# Patient Record
Sex: MALE | Race: WHITE | NOT HISPANIC OR LATINO | Employment: FULL TIME | URBAN - METROPOLITAN AREA
[De-identification: names, ages, dates, MRNs, and addresses within clinical notes are randomized per-mention and may not be internally consistent; named-entity substitution may affect disease eponyms.]

---

## 2019-04-08 ENCOUNTER — OFFICE VISIT (OUTPATIENT)
Dept: URGENT CARE | Facility: CLINIC | Age: 39
End: 2019-04-08
Payer: COMMERCIAL

## 2019-04-08 VITALS
RESPIRATION RATE: 16 BRPM | DIASTOLIC BLOOD PRESSURE: 82 MMHG | SYSTOLIC BLOOD PRESSURE: 124 MMHG | HEIGHT: 74 IN | WEIGHT: 240 LBS | HEART RATE: 100 BPM | OXYGEN SATURATION: 97 % | TEMPERATURE: 98.1 F | BODY MASS INDEX: 30.8 KG/M2

## 2019-04-08 DIAGNOSIS — J01.90 ACUTE BACTERIAL SINUSITIS: ICD-10-CM

## 2019-04-08 DIAGNOSIS — B96.89 ACUTE BACTERIAL SINUSITIS: ICD-10-CM

## 2019-04-08 DIAGNOSIS — J34.89 SINUS PRESSURE: ICD-10-CM

## 2019-04-08 PROCEDURE — 99203 OFFICE O/P NEW LOW 30 MIN: CPT | Performed by: NURSE PRACTITIONER

## 2019-04-08 RX ORDER — AMOXICILLIN AND CLAVULANATE POTASSIUM 875; 125 MG/1; MG/1
1 TABLET, FILM COATED ORAL 2 TIMES DAILY
Qty: 20 TAB | Refills: 0 | Status: SHIPPED | OUTPATIENT
Start: 2019-04-08 | End: 2019-04-18

## 2019-04-09 ASSESSMENT — ENCOUNTER SYMPTOMS
SINUS PRESSURE: 1
HEADACHES: 1
COUGH: 0
SINUS PAIN: 1

## 2019-04-09 NOTE — PROGRESS NOTES
"Subjective:      Jeronimo De La Cruz is a 38 y.o. male who presents with Pressure Behind the Eyes (x2 months on and off, headache, clumpy mucus)            Sinus Problem   This is a new problem. Episode onset: pt reports he has been having sinus pressure, drainage and pain located above his left eye for at least the last 2 months. He states his sinus symptoms seem to come and go, but they are always the same when he does have them. The problem is unchanged. There has been no fever. Associated symptoms include headaches and sinus pressure. Pertinent negatives include no congestion or coughing. (He states he cannot get rid of this headache pain above his left eye) Past treatments include spray decongestants and oral decongestants (neti pot rinses). The treatment provided no relief.       Review of Systems   HENT: Positive for sinus pain and sinus pressure. Negative for congestion.    Respiratory: Negative for cough.    Neurological: Positive for headaches.   All other systems reviewed and are negative.    History reviewed. No pertinent past medical history. History reviewed. No pertinent surgical history.   Social History     Social History   • Marital status:      Spouse name: N/A   • Number of children: N/A   • Years of education: N/A     Occupational History   • Not on file.     Social History Main Topics   • Smoking status: Never Smoker   • Smokeless tobacco: Never Used   • Alcohol use No   • Drug use: No   • Sexual activity: Not on file     Other Topics Concern   • Not on file     Social History Narrative   • No narrative on file          Objective:     /82   Pulse 100   Temp 36.7 °C (98.1 °F)   Resp 16   Ht 1.88 m (6' 2\")   Wt 108.9 kg (240 lb)   SpO2 97%   BMI 30.81 kg/m²      Physical Exam   Constitutional: He is oriented to person, place, and time. Vital signs are normal. He appears well-developed and well-nourished.   HENT:   Head: Normocephalic and atraumatic.   Right Ear: Tympanic membrane " and external ear normal.   Left Ear: Tympanic membrane and external ear normal.   Nose: Left sinus exhibits frontal sinus tenderness.   Eyes: Pupils are equal, round, and reactive to light. EOM are normal.   Neck: Normal range of motion.   Cardiovascular: Normal rate and regular rhythm.    Pulmonary/Chest: Effort normal and breath sounds normal.   Musculoskeletal: Normal range of motion.   Neurological: He is alert and oriented to person, place, and time.   Skin: Skin is warm and dry. Capillary refill takes less than 2 seconds.   Psychiatric: He has a normal mood and affect. His speech is normal and behavior is normal. Thought content normal.   Vitals reviewed.              Assessment/Plan:     1. Sinus pressure    2. Acute bacterial sinusitis  - amoxicillin-clavulanate (AUGMENTIN) 875-125 MG Tab; Take 1 Tab by mouth 2 times a day for 10 days.  Dispense: 20 Tab; Refill: 0    Discussed with patient we can try a course of ABX and monitor for improvement. He states at this point, he agrees with this as nothing else has helped  Tylenol and ibuprofen for pain  flonase twice daily for one week  Continue nasal saline rinses  Follow up with PCP if symptoms do not improve  Supportive care, differential diagnoses, and indications for immediate follow-up discussed with patient.    Pathogenesis of diagnosis discussed including typical length and natural progression.      Instructed to return to  or nearest emergency department if symptoms fail to improve, for any change in condition, further concerns, or new concerning symptoms.  Patient states understanding of the plan of care and discharge instructions.

## 2019-11-23 ENCOUNTER — OFFICE VISIT (OUTPATIENT)
Dept: URGENT CARE | Facility: CLINIC | Age: 39
End: 2019-11-23
Payer: COMMERCIAL

## 2019-11-23 VITALS
SYSTOLIC BLOOD PRESSURE: 110 MMHG | BODY MASS INDEX: 30.6 KG/M2 | RESPIRATION RATE: 16 BRPM | HEART RATE: 83 BPM | WEIGHT: 238.4 LBS | HEIGHT: 74 IN | TEMPERATURE: 97.5 F | DIASTOLIC BLOOD PRESSURE: 82 MMHG | OXYGEN SATURATION: 93 %

## 2019-11-23 DIAGNOSIS — H60.502 ACUTE OTITIS EXTERNA OF LEFT EAR, UNSPECIFIED TYPE: ICD-10-CM

## 2019-11-23 PROCEDURE — 99213 OFFICE O/P EST LOW 20 MIN: CPT | Performed by: FAMILY MEDICINE

## 2019-11-23 RX ORDER — NEOMYCIN SULFATE, POLYMYXIN B SULFATE AND HYDROCORTISONE 10; 3.5; 1 MG/ML; MG/ML; [USP'U]/ML
4 SUSPENSION/ DROPS AURICULAR (OTIC) 3 TIMES DAILY
Qty: 1 BOTTLE | Refills: 0 | Status: SHIPPED | OUTPATIENT
Start: 2019-11-23 | End: 2019-11-30

## 2019-11-23 ASSESSMENT — ENCOUNTER SYMPTOMS
WEIGHT LOSS: 0
VOMITING: 0
NAUSEA: 0
EYE REDNESS: 0
EYE DISCHARGE: 0
MYALGIAS: 0

## 2019-11-23 NOTE — PROGRESS NOTES
"Subjective:      Jeronimo De La Cruz is a 39 y.o. male who presents with Otalgia (left ear, dsicomfort x 2-3 days, also has cough and sore throat x 1 week )            3 days left earache. No drainage. No fever. Initially had mild ST and cough x 1 week. No change in hearing. No trauma/barotrauma. No recent swimming. +PMH otitis media. +PMH DM/unsure of recent control. Moderate pain with direct pressure to ear. No OTC medications.  No other aggravating or alleviating factors.        Review of Systems   Constitutional: Negative for malaise/fatigue and weight loss.   Eyes: Negative for discharge and redness.   Gastrointestinal: Negative for nausea and vomiting.   Musculoskeletal: Negative for joint pain and myalgias.   Skin: Negative for itching and rash.     .  Medications, Allergies, and current problem list reviewed today in Epic       Objective:     /82   Pulse 83   Temp 36.4 °C (97.5 °F) (Temporal)   Resp 16   Ht 1.88 m (6' 2\")   Wt 108.1 kg (238 lb 6.4 oz)   SpO2 93%   BMI 30.61 kg/m²      Physical Exam  Constitutional:       General: He is not in acute distress.     Appearance: He is well-developed.   HENT:      Head: Normocephalic and atraumatic.      Right Ear: Tympanic membrane and ear canal normal.      Left Ear: Tympanic membrane normal.      Ears:      Comments: Left canal red and swollen without pointing abscess. Pain with pinna movement.   Eyes:      Conjunctiva/sclera: Conjunctivae normal.   Cardiovascular:      Rate and Rhythm: Normal rate and regular rhythm.      Heart sounds: Normal heart sounds. No murmur.   Pulmonary:      Effort: Pulmonary effort is normal.      Breath sounds: Normal breath sounds. No wheezing.   Skin:     General: Skin is warm and dry.      Findings: No rash.   Neurological:      Mental Status: He is alert and oriented to person, place, and time.                 Assessment/Plan:     1. Acute otitis externa of left ear, unspecified type  neomycin-polymyxin-HC (PEDIOTIC " HC) [id number] Suspension     Differential diagnosis, natural history, supportive care, and indications for immediate follow-up discussed at length.

## 2020-03-11 ENCOUNTER — OFFICE VISIT (OUTPATIENT)
Dept: URGENT CARE | Facility: PHYSICIAN GROUP | Age: 40
End: 2020-03-11
Payer: COMMERCIAL

## 2020-03-11 VITALS
HEART RATE: 88 BPM | SYSTOLIC BLOOD PRESSURE: 104 MMHG | TEMPERATURE: 98 F | WEIGHT: 237.8 LBS | OXYGEN SATURATION: 97 % | HEIGHT: 74 IN | BODY MASS INDEX: 30.52 KG/M2 | DIASTOLIC BLOOD PRESSURE: 68 MMHG | RESPIRATION RATE: 18 BRPM

## 2020-03-11 DIAGNOSIS — H60.502 ACUTE OTITIS EXTERNA OF LEFT EAR, UNSPECIFIED TYPE: ICD-10-CM

## 2020-03-11 PROCEDURE — 99214 OFFICE O/P EST MOD 30 MIN: CPT | Performed by: PHYSICIAN ASSISTANT

## 2020-03-11 RX ORDER — OFLOXACIN 3 MG/ML
5 SOLUTION AURICULAR (OTIC) DAILY
Qty: 10 ML | Refills: 0 | Status: SHIPPED | OUTPATIENT
Start: 2020-03-11

## 2020-03-11 ASSESSMENT — ENCOUNTER SYMPTOMS
HEADACHES: 0
SORE THROAT: 0
VOMITING: 0
RHINORRHEA: 0

## 2020-03-11 ASSESSMENT — PAIN SCALES - GENERAL: PAINLEVEL: 1=MINIMAL PAIN

## 2020-03-11 NOTE — PROGRESS NOTES
"Subjective:   Jeronimo De La Cruz is a 39 y.o. male who presents for Otalgia ((L) ear, x1 day )        Otalgia    There is pain in both ears. This is a new problem. The current episode started yesterday. The problem occurs constantly. The problem has been gradually worsening. There has been no fever. The pain is mild. Pertinent negatives include no ear discharge, headaches, hearing loss, rhinorrhea, sore throat or vomiting. He has tried nothing for the symptoms. The treatment provided no relief. typically gets outter ear infection once a year.     Review of Systems   HENT: Positive for ear pain. Negative for ear discharge, hearing loss, rhinorrhea and sore throat.    Gastrointestinal: Negative for vomiting.   Neurological: Negative for headaches.       PMH:  has a past medical history of Diabetes ([facility]).  MEDS:   Current Outpatient Medications:   •  ofloxacin otic sol (FLOXIN OTIC) 0.3 % Solution, Place 5 Drops in ear every day., Disp: 10 mL, Rfl: 0  •  ALLOPURINOL PO, Take 800 mg by mouth., Disp: , Rfl:   ALLERGIES: No Known Allergies  SURGHX: History reviewed. No pertinent surgical history.  SOCHX:  reports that he has never smoked. He has never used smokeless tobacco. He reports that he does not drink alcohol or use drugs.  FH: Family history was reviewed, no pertinent findings to report   Objective:   /68 (BP Location: Left arm, Patient Position: Sitting, BP Cuff Size: Adult)   Pulse 88   Temp 36.7 °C (98 °F) (Temporal)   Resp 18   Ht 1.88 m (6' 2\")   Wt 107.9 kg (237 lb 12.8 oz)   SpO2 97%   BMI 30.53 kg/m²   Physical Exam  Vitals signs reviewed.   Constitutional:       General: He is not in acute distress.     Appearance: Normal appearance. He is well-developed. He is not toxic-appearing.   HENT:      Head: Normocephalic and atraumatic.      Right Ear: Tympanic membrane, ear canal and external ear normal.      Left Ear: Tympanic membrane and external ear normal.      Ears:      Comments: Left EAC is " mildly erythematous and edematous with scant white discharge.  Mild pain with manipulation of tragus and auricle.     Nose: Nose normal.   Eyes:      General: Lids are normal.      Conjunctiva/sclera: Conjunctivae normal.   Neck:      Musculoskeletal: Neck supple.   Cardiovascular:      Rate and Rhythm: Normal rate and regular rhythm.      Heart sounds: Normal heart sounds, S1 normal and S2 normal. No murmur. No friction rub. No gallop.    Pulmonary:      Effort: Pulmonary effort is normal. No respiratory distress.      Breath sounds: Normal breath sounds. No decreased breath sounds, wheezing, rhonchi or rales.   Skin:     General: Skin is warm and dry.      Capillary Refill: Capillary refill takes less than 2 seconds.   Neurological:      Mental Status: He is alert and oriented to person, place, and time.      Cranial Nerves: No cranial nerve deficit.      Sensory: No sensory deficit.   Psychiatric:         Speech: Speech normal.         Behavior: Behavior normal.         Thought Content: Thought content normal.         Judgment: Judgment normal.           Assessment/Plan:   1. Acute otitis externa of left ear, unspecified type  - ofloxacin otic sol (FLOXIN OTIC) 0.3 % Solution; Place 5 Drops in ear every day.  Dispense: 10 mL; Refill: 0    Differential diagnosis, natural history, supportive care, and indications for immediate follow-up discussed.

## 2024-01-08 ENCOUNTER — OFFICE VISIT (OUTPATIENT)
Dept: URGENT CARE | Facility: PHYSICIAN GROUP | Age: 44
End: 2024-01-08
Payer: COMMERCIAL

## 2024-01-08 VITALS
WEIGHT: 230 LBS | SYSTOLIC BLOOD PRESSURE: 122 MMHG | DIASTOLIC BLOOD PRESSURE: 76 MMHG | TEMPERATURE: 97.9 F | OXYGEN SATURATION: 97 % | BODY MASS INDEX: 29.52 KG/M2 | HEIGHT: 74 IN | RESPIRATION RATE: 17 BRPM | HEART RATE: 96 BPM

## 2024-01-08 DIAGNOSIS — B96.89 ACUTE BACTERIAL SINUSITIS: ICD-10-CM

## 2024-01-08 DIAGNOSIS — R05.1 ACUTE COUGH: ICD-10-CM

## 2024-01-08 DIAGNOSIS — J01.90 ACUTE BACTERIAL SINUSITIS: ICD-10-CM

## 2024-01-08 PROCEDURE — 3074F SYST BP LT 130 MM HG: CPT | Performed by: REGISTERED NURSE

## 2024-01-08 PROCEDURE — 99203 OFFICE O/P NEW LOW 30 MIN: CPT | Performed by: REGISTERED NURSE

## 2024-01-08 PROCEDURE — 3078F DIAST BP <80 MM HG: CPT | Performed by: REGISTERED NURSE

## 2024-01-08 RX ORDER — AMOXICILLIN AND CLAVULANATE POTASSIUM 875; 125 MG/1; MG/1
1 TABLET, FILM COATED ORAL 2 TIMES DAILY
Qty: 20 TABLET | Refills: 0 | Status: SHIPPED | OUTPATIENT
Start: 2024-01-08

## 2024-01-08 RX ORDER — DEXTROMETHORPHAN HYDROBROMIDE AND PROMETHAZINE HYDROCHLORIDE 15; 6.25 MG/5ML; MG/5ML
5 SYRUP ORAL EVERY 4 HOURS PRN
Qty: 120 ML | Refills: 0 | Status: SHIPPED | OUTPATIENT
Start: 2024-01-08

## 2024-01-08 RX ORDER — BENZONATATE 100 MG/1
100 CAPSULE ORAL 3 TIMES DAILY PRN
Qty: 30 CAPSULE | Refills: 0 | Status: SHIPPED | OUTPATIENT
Start: 2024-01-08 | End: 2024-01-08

## 2024-01-08 ASSESSMENT — ENCOUNTER SYMPTOMS
COUGH: 1
CHILLS: 0
SINUS PAIN: 1
DIZZINESS: 0
HEADACHES: 0
FEVER: 0
SORE THROAT: 1
SHORTNESS OF BREATH: 0

## 2024-01-08 NOTE — PROGRESS NOTES
"Subjective:   Jeronimo De La Cruz is a 43 y.o. male who presents for Cough (Cold symptoms on and off for a few weeks, went away came back worse a couple days ago, mucinex) and Nasal Congestion      HPI  Cold symptoms on and off for a few weeks, then over the past few days had worsening of sinus pressure, congestion, drainage. Its thick with green/yellow drainage. Using OTC cold and sinus medication with partial relief. Hx of ear infections. No hx of pna or hypoxia. Immunizations current.    Review of Systems   Constitutional:  Negative for chills and fever.   HENT:  Positive for congestion, sinus pain and sore throat.    Respiratory:  Positive for cough. Negative for shortness of breath.    Cardiovascular:  Negative for chest pain.   Skin:  Negative for rash.   Neurological:  Negative for dizziness and headaches.     Medications, Allergies, and current problem list reviewed today in Epic.     Objective:     /76   Pulse 96   Temp 36.6 °C (97.9 °F) (Temporal)   Resp 17   Ht 1.88 m (6' 2\")   Wt 104 kg (230 lb)   SpO2 97%     Physical Exam  Vitals and nursing note reviewed.   Constitutional:       General: He is not in acute distress.     Appearance: Normal appearance. He is well-developed. He is not ill-appearing, toxic-appearing or diaphoretic.   HENT:      Head: Normocephalic and atraumatic.      Right Ear: Hearing, tympanic membrane, ear canal and external ear normal.      Left Ear: Hearing, tympanic membrane, ear canal and external ear normal.      Nose: Mucosal edema, congestion and rhinorrhea present. Rhinorrhea is purulent.      Right Turbinates: Swollen.      Left Turbinates: Swollen.      Right Sinus: Maxillary sinus tenderness and frontal sinus tenderness present.      Left Sinus: Maxillary sinus tenderness and frontal sinus tenderness present.      Mouth/Throat:      Mouth: Mucous membranes are moist.      Dentition: Normal dentition. No dental caries.      Pharynx: Posterior oropharyngeal erythema " present. No oropharyngeal exudate.      Comments: Colored PND noted  Eyes:      General: No scleral icterus.        Right eye: No discharge.         Left eye: No discharge.      Conjunctiva/sclera: Conjunctivae normal.   Cardiovascular:      Rate and Rhythm: Normal rate and regular rhythm.      Pulses: Normal pulses.      Heart sounds: Normal heart sounds.   Pulmonary:      Effort: Pulmonary effort is normal. No respiratory distress.      Breath sounds: Normal breath sounds. No stridor. No wheezing, rhonchi or rales.   Musculoskeletal:      Cervical back: Normal range of motion and neck supple.      Right lower leg: No edema.      Left lower leg: No edema.   Lymphadenopathy:      Cervical: Cervical adenopathy present.   Skin:     General: Skin is warm and dry.      Findings: No rash.      Nails: There is no clubbing.   Neurological:      General: No focal deficit present.      Mental Status: He is alert and oriented to person, place, and time. Mental status is at baseline.   Psychiatric:         Mood and Affect: Mood normal.       Assessment/Plan:     Differential diagnosis discussed     1. Acute bacterial sinusitis  amoxicillin-clavulanate (AUGMENTIN) 875-125 MG Tab      2. Acute cough  promethazine-dextromethorphan (PROMETHAZINE-DM) 6.25-15 MG/5ML syrup    DISCONTINUED: benzonatate (TESSALON) 100 MG Cap        -2 weeks of on and off symptoms over the past few days worsening sinus symptoms with worsening pressure congestion and thick drainage  -Vital signs reassuring  -No adventitious lung sounds to indicate bacterial lung infection  -Upper airway exam consistent with bacterial sinusitis, no red flag signs or symptoms  -Augmentin  -Cough syrup to help with rest and cough at night  -Recommend adequate hydration, rest, deep breathing and coughing, ambulation as tolerated, OTC medications.  -Discussed return precautions    Return to clinic or go to ED if symptoms worsen or persist. Indications for ED discussed at  length. Red flag symptoms discussed. All side effects of medication discussed including allergic response, GI upset, tendon injury, rash, sedation etc. Patient and/or guardian voices understanding.     I personally reviewed prior external notes and test results pertinent to today's visit as well as additional imaging and testing completed in clinic today.     Please note that this dictation was created using voice recognition software. I have made every reasonable attempt to correct obvious errors, but I expect that there are errors of grammar and possibly content that I did not discover before finalizing the note.    This note was electronically signed by FADY Pedraza.

## 2024-12-26 ENCOUNTER — TELEPHONE (OUTPATIENT)
Dept: CARDIOLOGY | Facility: MEDICAL CENTER | Age: 44
End: 2024-12-26
Payer: COMMERCIAL

## 2024-12-26 NOTE — TELEPHONE ENCOUNTER
Called patient in regards to records for NP appointment with Dr. RUSSO To review most recent lab results, ekg, any cardiac testing or ,if patient has been treated by a cardiologist. No answer, left voicemail to call back

## 2025-01-13 ENCOUNTER — OFFICE VISIT (OUTPATIENT)
Dept: CARDIOLOGY | Facility: MEDICAL CENTER | Age: 45
End: 2025-01-13
Attending: INTERNAL MEDICINE
Payer: COMMERCIAL

## 2025-01-13 VITALS
WEIGHT: 219 LBS | DIASTOLIC BLOOD PRESSURE: 76 MMHG | SYSTOLIC BLOOD PRESSURE: 104 MMHG | BODY MASS INDEX: 28.11 KG/M2 | RESPIRATION RATE: 16 BRPM | HEIGHT: 74 IN | HEART RATE: 93 BPM | OXYGEN SATURATION: 96 %

## 2025-01-13 DIAGNOSIS — I42.9 CARDIOMYOPATHY, UNSPECIFIED TYPE (HCC): ICD-10-CM

## 2025-01-13 DIAGNOSIS — Z76.89 ENCOUNTER TO ESTABLISH CARE: ICD-10-CM

## 2025-01-13 PROCEDURE — 99204 OFFICE O/P NEW MOD 45 MIN: CPT | Performed by: INTERNAL MEDICINE

## 2025-01-13 PROCEDURE — 99202 OFFICE O/P NEW SF 15 MIN: CPT | Performed by: INTERNAL MEDICINE

## 2025-01-13 PROCEDURE — 93005 ELECTROCARDIOGRAM TRACING: CPT | Mod: TC | Performed by: INTERNAL MEDICINE

## 2025-01-13 RX ORDER — LOSARTAN POTASSIUM 25 MG/1
25 TABLET ORAL DAILY
Qty: 90 TABLET | Refills: 3 | Status: SHIPPED | OUTPATIENT
Start: 2025-01-13

## 2025-01-13 RX ORDER — METOPROLOL SUCCINATE 25 MG/1
25 TABLET, EXTENDED RELEASE ORAL DAILY
Qty: 90 TABLET | Refills: 3 | Status: SHIPPED | OUTPATIENT
Start: 2025-01-13

## 2025-01-13 RX ORDER — LOSARTAN POTASSIUM 25 MG/1
25 TABLET ORAL DAILY
COMMUNITY
Start: 2024-12-07 | End: 2025-01-13 | Stop reason: SDUPTHER

## 2025-01-13 RX ORDER — SPIRONOLACTONE 25 MG/1
25 TABLET ORAL DAILY
COMMUNITY
Start: 2024-12-07 | End: 2025-01-13 | Stop reason: SDUPTHER

## 2025-01-13 RX ORDER — ATORVASTATIN CALCIUM 40 MG/1
40 TABLET, FILM COATED ORAL DAILY
COMMUNITY
Start: 2024-12-07 | End: 2025-01-16 | Stop reason: SDUPTHER

## 2025-01-13 RX ORDER — SPIRONOLACTONE 25 MG/1
25 TABLET ORAL DAILY
Qty: 90 TABLET | Refills: 3 | Status: SHIPPED | OUTPATIENT
Start: 2025-01-13

## 2025-01-13 RX ORDER — METOPROLOL TARTRATE 25 MG/1
25 TABLET, FILM COATED ORAL
COMMUNITY
Start: 2024-12-07 | End: 2025-01-13

## 2025-01-13 ASSESSMENT — ENCOUNTER SYMPTOMS
ORTHOPNEA: 0
DIZZINESS: 0
BACK PAIN: 0
ABDOMINAL PAIN: 0
FEVER: 0
WEIGHT GAIN: 0
ALTERED MENTAL STATUS: 0
PALPITATIONS: 0
BLURRED VISION: 0
NEAR-SYNCOPE: 0
DIARRHEA: 0
DYSPNEA ON EXERTION: 0
FLANK PAIN: 0
PND: 0
WEIGHT LOSS: 0
NAUSEA: 0
HEARTBURN: 0
SYNCOPE: 0
DEPRESSION: 0
CONSTIPATION: 0
CLAUDICATION: 0
IRREGULAR HEARTBEAT: 0
SHORTNESS OF BREATH: 0
DECREASED APPETITE: 0
VOMITING: 0
COUGH: 0

## 2025-01-13 ASSESSMENT — FIBROSIS 4 INDEX: FIB4 SCORE: 0.47

## 2025-01-13 NOTE — PROGRESS NOTES
Cardiology Note    Chief Complaint   Patient presents with    New Patient     NP Dx: Establish Care       History of Present Illness: Jeronimo De La Cruz is a 44 y.o. male PMH DM2 who presents for initial visit.     Admitted [facility] 12/10/24 for acute hypoxic respiratory failure found possibly pneumonia and new HFrEF.     Recalls had shortness of breath and flu like symptoms. No other symptoms. No chest pain/pressure. No orthopnea, pnd. No edema. Additionally in October prior to hospitalization he had a stressful day and developed shortness of breath and chest pain following stay-cation at [facility]. Took aspirin. Improved. Slept through it. Did not seek medical attention.     Review of Systems   Constitutional: Negative for decreased appetite, fever, malaise/fatigue, weight gain and weight loss.   HENT:  Negative for congestion and nosebleeds.    Eyes:  Negative for blurred vision.   Cardiovascular:  Negative for chest pain, claudication, dyspnea on exertion, irregular heartbeat, leg swelling, near-syncope, orthopnea, palpitations, paroxysmal nocturnal dyspnea and syncope.   Respiratory:  Negative for cough and shortness of breath.    Endocrine: Negative for cold intolerance and heat intolerance.   Skin:  Negative for rash.   Musculoskeletal:  Negative for back pain.   Gastrointestinal:  Negative for abdominal pain, constipation, diarrhea, heartburn, melena, nausea and vomiting.   Genitourinary:  Negative for dysuria, flank pain and hematuria.   Neurological:  Negative for dizziness.   Psychiatric/Behavioral:  Negative for altered mental status and depression.          Past Medical History:   Diagnosis Date    Diabetes (HCC)          History reviewed. No pertinent surgical history.      Current Outpatient Medications   Medication Sig Dispense Refill    atorvastatin (LIPITOR) 40 MG Tab Take 40 mg by mouth every day.      metFORMIN (GLUCOPHAGE) 850 MG Tab Take 750 mg by mouth 2 times a day.      metoprolol SR (TOPROL  XL) 25 MG TABLET SR 24 HR Take 1 Tablet by mouth every day. 90 Tablet 3    losartan (COZAAR) 25 MG Tab Take 1 Tablet by mouth every day. 90 Tablet 3    spironolactone (ALDACTONE) 25 MG Tab Take 1 Tablet by mouth every day. 90 Tablet 3    promethazine-dextromethorphan (PROMETHAZINE-DM) 6.25-15 MG/5ML syrup Take 5 mL by mouth every four hours as needed for Cough. 120 mL 0    ofloxacin otic sol (FLOXIN OTIC) 0.3 % Solution Place 5 Drops in ear every day. 10 mL 0    ALLOPURINOL PO Take 800 mg by mouth.      amoxicillin-clavulanate (AUGMENTIN) 875-125 MG Tab Take 1 Tablet by mouth 2 times a day. (Patient not taking: Reported on 1/13/2025) 20 Tablet 0     No current facility-administered medications for this visit.         No Known Allergies      History reviewed. No pertinent family history.      Social History     Socioeconomic History    Marital status:      Spouse name: Not on file    Number of children: Not on file    Years of education: Not on file    Highest education level: Not on file   Occupational History    Not on file   Tobacco Use    Smoking status: Never    Smokeless tobacco: Never   Substance and Sexual Activity    Alcohol use: No    Drug use: No    Sexual activity: Not on file   Other Topics Concern    Not on file   Social History Narrative    Not on file     Social Drivers of Health     Financial Resource Strain: Low Risk  (12/10/2024)    Received from [facility]    Overall Financial Resource Strain (CARDIA)     Difficulty of Paying Living Expenses: Not very hard   Food Insecurity: No Food Insecurity (12/10/2024)    Received from [facility]    Hunger Vital Sign     Worried About Running Out of Food in the Last Year: Never true     Ran Out of Food in the Last Year: Never true   Transportation Needs: No Transportation Needs (12/10/2024)    Received from [facility]    PRAPARE - Transportation     Lack of Transportation (Medical): No     Lack of Transportation (Non-Medical): No  "  Physical Activity: Sufficiently Active (12/10/2024)    Received from [facility]    Exercise Vital Sign     Days of Exercise per Week: 5 days     Minutes of Exercise per Session: 70 min   Stress: No Stress Concern Present (12/10/2024)    Received from [facility]    [employer] - Occupational Stress Questionnaire     Feeling of Stress : Not at all   Social Connections: Moderately Isolated (12/10/2024)    Received from [facility]    Social Connection and Isolation Panel [NHANES]     Frequency of Communication with Friends and Family: More than three times a week     Frequency of Social Gatherings with Friends and Family: More than three times a week     Attends [employer] Services: Never     Active Member of Clubs or Organizations: No     Attends Club or Organization Meetings: Never     Marital Status: Living with partner   Intimate Partner Violence: Not At Risk (12/10/2024)    Received from [facility]    Humiliation, Afraid, Rape, and Kick questionnaire     Fear of Current or Ex-Partner: No     Emotionally Abused: No     Physically Abused: No     Sexually Abused: No   Housing Stability: Low Risk  (12/10/2024)    Received from [facility]    Housing Stability Vital Sign     Unable to Pay for Housing in the Last Year: No     Number of Times Moved in the Last Year: 0     Homeless in the Last Year: No         Physical Exam:  Ambulatory Vitals  /76 (BP Location: Left arm, Patient Position: Sitting, BP Cuff Size: Adult)   Pulse 93   Resp 16   Ht 1.88 m (6' 2\")   Wt 99.3 kg (219 lb)   SpO2 96%    BP Readings from Last 4 Encounters:   01/13/25 104/76   01/08/24 122/76   03/11/20 104/68   11/23/19 110/82     Weight/BMI:   Vitals:    01/13/25 0720   BP: 104/76   Weight: 99.3 kg (219 lb)   Height: 1.88 m (6' 2\")    Body mass index is 28.12 kg/m².  Wt Readings from Last 4 Encounters:   01/13/25 99.3 kg (219 lb)   01/08/24 104 kg (230 lb)   03/11/20 108 kg (237 " "lb 12.8 oz)   11/23/19 108 kg (238 lb 6.4 oz)       Physical Exam  Constitutional:       General: He is not in acute distress.  HENT:      Head: Normocephalic and atraumatic.   Eyes:      Conjunctiva/sclera: Conjunctivae normal.      Pupils: Pupils are equal, round, and reactive to light.   Neck:      Vascular: No JVD.   Cardiovascular:      Rate and Rhythm: Normal rate and regular rhythm.      Heart sounds: Normal heart sounds. No murmur heard.     No friction rub. No gallop.   Pulmonary:      Effort: Pulmonary effort is normal. No respiratory distress.      Breath sounds: Normal breath sounds. No wheezing or rales.   Chest:      Chest wall: No tenderness.   Abdominal:      General: Bowel sounds are normal. There is no distension.      Palpations: Abdomen is soft.   Musculoskeletal:      Cervical back: Normal range of motion and neck supple.   Skin:     General: Skin is warm and dry.   Neurological:      Mental Status: He is alert and oriented to person, place, and time.   Psychiatric:         Mood and Affect: Affect normal.         Judgment: Judgment normal.         Lab Data Review:  No results found for: \"CHOLSTRLTOT\", \"LDL\", \"HDL\", \"TRIGLYCERIDE\"    Lab Results   Component Value Date/Time    SODIUM 141 12/12/2024 03:51 AM    POTASSIUM 3.7 12/12/2024 03:51 AM    CHLORIDE 101 12/12/2024 03:51 AM    CO2 27.0 12/12/2024 03:51 AM    GLUCOSE 176 (H) 12/12/2024 03:51 AM    BUN 7.0 12/12/2024 03:51 AM    CREATININE 0.80 12/12/2024 03:51 AM    BUNCREATRAT 8.8 (L) 12/12/2024 03:51 AM     CrCl cannot be calculated (Patient's most recent lab result is older than the maximum 7 days allowed.).  Lab Results   Component Value Date/Time    ALKPHOSPHAT 44 12/12/2024 03:51 AM    ASTSGOT 15 12/12/2024 03:51 AM    ALTSGPT 18 12/12/2024 03:51 AM    TBILIRUBIN 1.3 (H) 12/12/2024 03:51 AM      Lab Results   Component Value Date/Time    WBC 12.70 (H) 12/12/2024 03:51 AM     Lab Results   Component Value Date/Time    HBA1C 8.8 (H) " "12/11/2024 04:07 AM     No components found for: \"TROP\"      Cardiac Imaging and Procedures Review:      EKG 1/13/25 interpreted by me sinus, biphasic T waves consider ischemia    TTE 12/10/24  Conclusion   1. The left ventricle is normal in size with mildly reduced LV systolic function; Ejection fraction of 40-45% by Pacheco's biplane with global hypokinesis as well as severe hypokinesis of the apex, distal septum, and distal anterior. Abnormal global longitudinal systolic strain -10%. There is normal left ventricular wall thickness by visual estimation. Indeterminate diastolic function due to E/A fusion.   2. Mild mitral regurgitation.   3. The right ventricle is normal in size with overall preserved function; however, there is reduced function at the RV apex. RVSP is mildly elevated at 41 mmHg, given an RAP 3 mmHg.     Medical Decision Making:  Problem List Items Addressed This Visit       Encounter to establish care    Relevant Orders    EKG (Completed)    Referral to establish with PCP    Cardiomyopathy ([facility])    Relevant Medications    atorvastatin (LIPITOR) 40 MG Tab    metoprolol SR (TOPROL XL) 25 MG TABLET SR 24 HR    losartan (COZAAR) 25 MG Tab    spironolactone (ALDACTONE) 25 MG Tab    Other Relevant Orders    CT-CTA HEART W/3D IMAGE    EC-ECHOCARDIOGRAM COMPLETE W/O CONT    Comp Metabolic Panel    Referral to establish with PCP       New cardiomyopathy with mildly reduced left ventricular systolic function. Unclear etiology. Possibly post viral. Check CCTA rule out ischemia. Repeat TTE. Continue GDMT for now.     It was my pleasure to meet with Mr. De La Cruz.                        "

## 2025-01-14 LAB — EKG IMPRESSION: NORMAL

## 2025-01-14 PROCEDURE — 93010 ELECTROCARDIOGRAM REPORT: CPT | Performed by: INTERNAL MEDICINE

## 2025-01-15 ENCOUNTER — TELEPHONE (OUTPATIENT)
Dept: CARDIOLOGY | Facility: MEDICAL CENTER | Age: 45
End: 2025-01-15
Payer: COMMERCIAL

## 2025-01-15 DIAGNOSIS — I42.9 CARDIOMYOPATHY, UNSPECIFIED TYPE (HCC): ICD-10-CM

## 2025-01-15 DIAGNOSIS — I42.8 OTHER CARDIOMYOPATHY (HCC): ICD-10-CM

## 2025-01-15 DIAGNOSIS — E11.9 CONTROLLED TYPE 2 DIABETES MELLITUS WITHOUT COMPLICATION, UNSPECIFIED WHETHER LONG TERM INSULIN USE (HCC): ICD-10-CM

## 2025-01-15 NOTE — TELEPHONE ENCOUNTER
VR    Caller: Jeronimo De La Cruz    Topic/issue: Following visit, he went to the pharmacy and only three of the medications prescribed while in the hospital were sent over.     The atorvastatin and metformin were not sent. Patient asking if this is because Dr. Gil does not want him to continue these? Please advise.    Callback Number: [phone]    Thank you,  Liss MORIN

## 2025-01-16 RX ORDER — ATORVASTATIN CALCIUM 40 MG/1
40 TABLET, FILM COATED ORAL DAILY
Qty: 100 TABLET | Refills: 3 | Status: SHIPPED | OUTPATIENT
Start: 2025-01-16

## 2025-01-16 NOTE — TELEPHONE ENCOUNTER
Phone Number Called: [phone]    Call outcome: Spoke to patient regarding message below.    Message: Called to inform patient that we refilled atorvastatin. PCP will need to refill metformin. Patient verbalizes understanding. Has to get new PCP. Needs short supply of metformin. Ok to send follow up MyChart    To VR: Ok to send short supply of metformin to get to March appointment?

## 2025-01-17 DIAGNOSIS — Z01.812 PRE-PROCEDURE LAB EXAM: ICD-10-CM

## 2025-01-17 NOTE — PROGRESS NOTES
Request received to review order/ protocol for coronary CTA on 1/16/25. Scan approved for [facility] scanner. Upon review of available medical records, the following orders have been placed:    Order placed for outpatient, non fasting creatinine blood draw to check kidney function within 30 days prior to contrast administration for coronary CTA if determined medically necessary by CT staff. Instructions for timing of blood test to be given by scheduling/ CT facility team.    Patient should take usual dose of prescribed beta blocker.    Hold metformin per CTA protocol.    This CT study may be scheduled through [employer] [facility] at , option 2.

## 2025-02-25 ENCOUNTER — PATIENT MESSAGE (OUTPATIENT)
Dept: CARDIOLOGY | Facility: MEDICAL CENTER | Age: 45
End: 2025-02-25
Payer: COMMERCIAL

## 2025-02-25 NOTE — TELEPHONE ENCOUNTER
VR- Please review patient message when you return. Patient asking for you to review his most recent labs. They have been requested from [facility]. Is also asking if his statin is still needed? Thank you!

## 2025-03-10 NOTE — PATIENT COMMUNICATION
VR: Pt is not going to have the echo or CT due to cost. He has an active CMP, however recent lab completed 12/12/24. Please advise. Thank you

## 2025-03-21 ENCOUNTER — APPOINTMENT (OUTPATIENT)
Dept: CARDIOLOGY | Facility: MEDICAL CENTER | Age: 45
End: 2025-03-21
Attending: INTERNAL MEDICINE
Payer: COMMERCIAL

## 2025-04-03 SDOH — HEALTH STABILITY: MENTAL HEALTH
STRESS IS WHEN SOMEONE FEELS TENSE, NERVOUS, ANXIOUS, OR CAN'T SLEEP AT NIGHT BECAUSE THEIR MIND IS TROUBLED. HOW STRESSED ARE YOU?: TO SOME EXTENT

## 2025-04-03 SDOH — ECONOMIC STABILITY: FOOD INSECURITY: WITHIN THE PAST 12 MONTHS, YOU WORRIED THAT YOUR FOOD WOULD RUN OUT BEFORE YOU GOT MONEY TO BUY MORE.: NEVER TRUE

## 2025-04-03 SDOH — ECONOMIC STABILITY: TRANSPORTATION INSECURITY
IN THE PAST 12 MONTHS, HAS LACK OF RELIABLE TRANSPORTATION KEPT YOU FROM MEDICAL APPOINTMENTS, MEETINGS, WORK OR FROM GETTING THINGS NEEDED FOR DAILY LIVING?: NO

## 2025-04-03 SDOH — ECONOMIC STABILITY: INCOME INSECURITY: HOW HARD IS IT FOR YOU TO PAY FOR THE VERY BASICS LIKE FOOD, HOUSING, MEDICAL CARE, AND HEATING?: NOT VERY HARD

## 2025-04-03 SDOH — ECONOMIC STABILITY: FOOD INSECURITY: WITHIN THE PAST 12 MONTHS, THE FOOD YOU BOUGHT JUST DIDN'T LAST AND YOU DIDN'T HAVE MONEY TO GET MORE.: NEVER TRUE

## 2025-04-03 SDOH — ECONOMIC STABILITY: TRANSPORTATION INSECURITY
IN THE PAST 12 MONTHS, HAS THE LACK OF TRANSPORTATION KEPT YOU FROM MEDICAL APPOINTMENTS OR FROM GETTING MEDICATIONS?: NO

## 2025-04-03 SDOH — ECONOMIC STABILITY: INCOME INSECURITY: IN THE LAST 12 MONTHS, WAS THERE A TIME WHEN YOU WERE NOT ABLE TO PAY THE MORTGAGE OR RENT ON TIME?: NO

## 2025-04-03 SDOH — HEALTH STABILITY: PHYSICAL HEALTH: ON AVERAGE, HOW MANY MINUTES DO YOU ENGAGE IN EXERCISE AT THIS LEVEL?: 120 MIN

## 2025-04-03 SDOH — HEALTH STABILITY: PHYSICAL HEALTH: ON AVERAGE, HOW MANY DAYS PER WEEK DO YOU ENGAGE IN MODERATE TO STRENUOUS EXERCISE (LIKE A BRISK WALK)?: 7 DAYS

## 2025-04-03 SDOH — ECONOMIC STABILITY: TRANSPORTATION INSECURITY
IN THE PAST 12 MONTHS, HAS LACK OF TRANSPORTATION KEPT YOU FROM MEETINGS, WORK, OR FROM GETTING THINGS NEEDED FOR DAILY LIVING?: NO

## 2025-04-03 SDOH — ECONOMIC STABILITY: HOUSING INSECURITY
IN THE LAST 12 MONTHS, WAS THERE A TIME WHEN YOU DID NOT HAVE A STEADY PLACE TO SLEEP OR SLEPT IN A SHELTER (INCLUDING NOW)?: NO

## 2025-04-03 ASSESSMENT — SOCIAL DETERMINANTS OF HEALTH (SDOH)
HOW OFTEN DO YOU HAVE A DRINK CONTAINING ALCOHOL: NEVER
IN THE PAST 12 MONTHS, HAS THE ELECTRIC, GAS, OIL, OR WATER COMPANY THREATENED TO SHUT OFF SERVICE IN YOUR HOME?: YES
IN A TYPICAL WEEK, HOW MANY TIMES DO YOU TALK ON THE PHONE WITH FAMILY, FRIENDS, OR NEIGHBORS?: MORE THAN THREE TIMES A WEEK
HOW OFTEN DO YOU ATTENT MEETINGS OF THE CLUB OR ORGANIZATION YOU BELONG TO?: NEVER
HOW HARD IS IT FOR YOU TO PAY FOR THE VERY BASICS LIKE FOOD, HOUSING, MEDICAL CARE, AND HEATING?: NOT VERY HARD
HOW OFTEN DO YOU HAVE SIX OR MORE DRINKS ON ONE OCCASION: NEVER
HOW MANY DRINKS CONTAINING ALCOHOL DO YOU HAVE ON A TYPICAL DAY WHEN YOU ARE DRINKING: PATIENT DOES NOT DRINK
HOW OFTEN DO YOU GET TOGETHER WITH FRIENDS OR RELATIVES?: NEVER
WITHIN THE PAST 12 MONTHS, YOU WORRIED THAT YOUR FOOD WOULD RUN OUT BEFORE YOU GOT THE MONEY TO BUY MORE: NEVER TRUE
DO YOU BELONG TO ANY CLUBS OR ORGANIZATIONS SUCH AS CHURCH GROUPS UNIONS, FRATERNAL OR ATHLETIC GROUPS, OR SCHOOL GROUPS?: NO
HOW OFTEN DO YOU ATTEND CHURCH OR RELIGIOUS SERVICES?: NEVER
IN A TYPICAL WEEK, HOW MANY TIMES DO YOU TALK ON THE PHONE WITH FAMILY, FRIENDS, OR NEIGHBORS?: MORE THAN THREE TIMES A WEEK
HOW OFTEN DO YOU ATTEND CHURCH OR RELIGIOUS SERVICES?: NEVER
HOW OFTEN DO YOU ATTENT MEETINGS OF THE CLUB OR ORGANIZATION YOU BELONG TO?: NEVER
HOW OFTEN DO YOU GET TOGETHER WITH FRIENDS OR RELATIVES?: NEVER
DO YOU BELONG TO ANY CLUBS OR ORGANIZATIONS SUCH AS CHURCH GROUPS UNIONS, FRATERNAL OR ATHLETIC GROUPS, OR SCHOOL GROUPS?: NO

## 2025-04-03 ASSESSMENT — LIFESTYLE VARIABLES
HOW OFTEN DO YOU HAVE A DRINK CONTAINING ALCOHOL: NEVER
HOW OFTEN DO YOU HAVE SIX OR MORE DRINKS ON ONE OCCASION: NEVER
SKIP TO QUESTIONS 9-10: 1
AUDIT-C TOTAL SCORE: 0
HOW MANY STANDARD DRINKS CONTAINING ALCOHOL DO YOU HAVE ON A TYPICAL DAY: PATIENT DOES NOT DRINK

## 2025-04-04 ENCOUNTER — OFFICE VISIT (OUTPATIENT)
Dept: MEDICAL GROUP | Facility: MEDICAL CENTER | Age: 45
End: 2025-04-04
Payer: COMMERCIAL

## 2025-04-04 ENCOUNTER — PHARMACY VISIT (OUTPATIENT)
Dept: PHARMACY | Facility: MEDICAL CENTER | Age: 45
End: 2025-04-04
Payer: MEDICARE

## 2025-04-04 VITALS
WEIGHT: 217 LBS | RESPIRATION RATE: 16 BRPM | HEART RATE: 94 BPM | TEMPERATURE: 97.7 F | SYSTOLIC BLOOD PRESSURE: 96 MMHG | BODY MASS INDEX: 27.85 KG/M2 | HEIGHT: 74 IN | DIASTOLIC BLOOD PRESSURE: 64 MMHG | OXYGEN SATURATION: 96 %

## 2025-04-04 DIAGNOSIS — M1A.09X1 IDIOPATHIC CHRONIC GOUT OF MULTIPLE SITES WITH TOPHUS: ICD-10-CM

## 2025-04-04 DIAGNOSIS — E11.9 TYPE 2 DIABETES MELLITUS WITHOUT COMPLICATION, WITHOUT LONG-TERM CURRENT USE OF INSULIN (HCC): Primary | ICD-10-CM

## 2025-04-04 DIAGNOSIS — I42.9 CARDIOMYOPATHY, UNSPECIFIED TYPE (HCC): ICD-10-CM

## 2025-04-04 PROBLEM — M1A.09X0 IDIOPATHIC CHRONIC GOUT OF MULTIPLE SITES WITHOUT TOPHUS: Status: ACTIVE | Noted: 2025-04-04

## 2025-04-04 PROCEDURE — 3078F DIAST BP <80 MM HG: CPT | Performed by: STUDENT IN AN ORGANIZED HEALTH CARE EDUCATION/TRAINING PROGRAM

## 2025-04-04 PROCEDURE — RXMED WILLOW AMBULATORY MEDICATION CHARGE: Performed by: STUDENT IN AN ORGANIZED HEALTH CARE EDUCATION/TRAINING PROGRAM

## 2025-04-04 PROCEDURE — 3074F SYST BP LT 130 MM HG: CPT | Performed by: STUDENT IN AN ORGANIZED HEALTH CARE EDUCATION/TRAINING PROGRAM

## 2025-04-04 PROCEDURE — 99214 OFFICE O/P EST MOD 30 MIN: CPT | Performed by: STUDENT IN AN ORGANIZED HEALTH CARE EDUCATION/TRAINING PROGRAM

## 2025-04-04 RX ORDER — SEMAGLUTIDE 0.68 MG/ML
INJECTION, SOLUTION SUBCUTANEOUS
COMMUNITY
Start: 2025-02-25

## 2025-04-04 RX ORDER — ASPIRIN 81 MG/1
81 TABLET ORAL DAILY
COMMUNITY
Start: 2025-04-04

## 2025-04-04 RX ORDER — COLCHICINE 0.6 MG/1
0.6 TABLET ORAL 2 TIMES DAILY
Qty: 60 TABLET | Refills: 1 | Status: SHIPPED | OUTPATIENT
Start: 2025-04-04

## 2025-04-04 RX ORDER — EMPAGLIFLOZIN, METFORMIN HYDROCHLORIDE 12.5; 1 MG/1; MG/1
TABLET, EXTENDED RELEASE ORAL
COMMUNITY

## 2025-04-04 RX ORDER — INDOMETHACIN 25 MG/1
25 CAPSULE ORAL 3 TIMES DAILY
COMMUNITY
End: 2025-04-04

## 2025-04-04 RX ORDER — HYDROCODONE BITARTRATE AND ACETAMINOPHEN 10; 325 MG/1; MG/1
1-2 TABLET ORAL EVERY 6 HOURS PRN
COMMUNITY

## 2025-04-04 ASSESSMENT — PATIENT HEALTH QUESTIONNAIRE - PHQ9: CLINICAL INTERPRETATION OF PHQ2 SCORE: 0

## 2025-04-04 ASSESSMENT — FIBROSIS 4 INDEX: FIB4 SCORE: 0.47

## 2025-04-04 ASSESSMENT — PAIN SCALES - GENERAL: PAINLEVEL_OUTOF10: NO PAIN

## 2025-04-04 NOTE — LETTER
[facility]  No primary care provider on file.  No primary provider on file.  Fax: [phone]   Authorization for Release/Disclosure of   Protected Health Information   Name: MARCELO CERVANTES : 1980 SSN: [id number]   Address: [address] [place] [id number] Phone:    There are no phone numbers on file.   I authorize the entity listed below to release/disclose the PHI below to:   [facility]/No primary care provider on file. and Sabra Acuña M.D.   Provider or Entity Name: Jamaal Beck MD     Address   [facility], [address]  [address], [place] [address] Phone: [phone]      Fax:     Reason for request: continuity of care   Information to be released:    [  ] LAST COLONOSCOPY,  including any PATH REPORT and follow-up  [  ] LAST FIT/COLOGUARD RESULT [  ] LAST DEXA  [  ] LAST MAMMOGRAM  [  ] LAST PAP  [  ] LAST LABS [  ] RETINA EXAM REPORT  [  ] IMMUNIZATION RECORDS  [  ] Release all info      [  ] Check here and initial the line next to each item to release ALL health information INCLUDING  _____ Care and treatment for drug and / or alcohol abuse  _____ HIV testing, infection status, or AIDS  _____ Genetic Testing    DATES OF SERVICE OR TIME PERIOD TO BE DISCLOSED: _____________  I understand and acknowledge that:  * This Authorization may be revoked at any time by you in writing, except if your health information has already been used or disclosed.  * Your health information that will be used or disclosed as a result of you signing this authorization could be re-disclosed by the recipient. If this occurs, your re-disclosed health information may no longer be protected by State or Federal laws.  * You may refuse to sign this Authorization. Your refusal will not affect your ability to obtain treatment.  * This Authorization becomes effective upon signing and will  on (date) __________.      If no date is indicated, this Authorization will  one (1) year from the  signature date.    Name: Jeronimo Espinosa Jono  Signature: Date:   4/4/2025     PLEASE FAX REQUESTED RECORDS BACK TO: [phone]

## 2025-04-04 NOTE — LETTER
[facility]  No primary care provider on file.  No primary provider on file.  Fax: [phone]   Authorization for Release/Disclosure of   Protected Health Information   Name: MARCELO CERVANTES : 1980 SSN: [id number]   Address: [address] [place] [address] [place] [address] Phone:    There are no phone numbers on file.   I authorize the entity listed below to release/disclose the PHI below to:   [facility]/No primary care provider on file. and Sabra Acuña M.D.   Provider or Entity Name: [facility]     Address   [facility], [facility], [address]   [address], [place] [address] Phone: [phone]      Fax:     Reason for request: continuity of care   Information to be released:    [  ] LAST COLONOSCOPY,  including any PATH REPORT and follow-up  [  ] LAST FIT/COLOGUARD RESULT [  ] LAST DEXA  [  ] LAST MAMMOGRAM  [  ] LAST PAP  [  ] LAST LABS [  ] RETINA EXAM REPORT  [  ] IMMUNIZATION RECORDS  [  ] Release all info      [  ] Check here and initial the line next to each item to release ALL health information INCLUDING  _____ Care and treatment for drug and / or alcohol abuse  _____ HIV testing, infection status, or AIDS  _____ Genetic Testing    DATES OF SERVICE OR TIME PERIOD TO BE DISCLOSED: _____________  I understand and acknowledge that:  * This Authorization may be revoked at any time by you in writing, except if your health information has already been used or disclosed.  * Your health information that will be used or disclosed as a result of you signing this authorization could be re-disclosed by the recipient. If this occurs, your re-disclosed health information may no longer be protected by State or Federal laws.  * You may refuse to sign this Authorization. Your refusal will not affect your ability to obtain treatment.  * This Authorization becomes effective upon signing and will  on (date) __________.      If no date is indicated, this Authorization will  one (1)  year from the signature date.    Name: Jeronimo Espinosa Jono  Signature: Date:   4/4/2025     PLEASE FAX REQUESTED RECORDS BACK TO: [phone]

## 2025-04-09 NOTE — PROGRESS NOTES
Subjective:     CC:   Chief Complaint   Patient presents with    Establish Care     Hx of gout, needs medication refills        HPI:   Jeronimo presents today with    Verbal consent was acquired by the patient to use [employer] ambient listening note generation during this visit Yes   History of Present Illness  The patient presents to [facility].    He has been under the care of Dr. Matias for cardiomyopathy, with a scheduled follow-up on 08/07/2025. He was diagnosed with pneumonia in December 2024, which led to concerns about his cardiac health. An echocardiogram was performed, and an angiogram was recommended. However, he opted to postpone the angiogram until after his recovery from pneumonia. He has had several consultations with Dr. Matias, who advised him to complete his current medication regimen before scheduling another appointment. His ejection fraction (EF) was reported to be between 40% and 45%. He has not undergone any catheterization procedures. He is currently on metoprolol 25 mg, losartan 25 mg, spironolactone, and Lipitor. He also takes one baby aspirin daily. He reports no dizziness and maintains adequate hydration.    His most recent A1c level was recorded as 8.8 on 02/11/2025. He has been managing type 1 diabetes for the past 25 years, diagnosed at the age of 18. He has been insulin-dependent since diagnosis but has been gradually reducing his insulin intake over the years. His wife, a registered nurse, suspects that he may not have type 1 diabetes. He initiated appointments with [facility] approximately a year ago but faced difficulties in scheduling. He was finally seen about a month and a half ago, where it was determined that he is producing insulin, albeit at low levels. Consequently, his insulin was discontinued, and he was started on Ozempic. He has not experienced any weight loss since starting Ozempic but did lose significant weight (30-40 pounds) during his illness last year. He recently  "increased his Ozempic dosage to 0.5 last week. He is also on Synjardy. He was previously on a continuous glucose monitor (CGM) for two weeks, but it did not result in any changes.    He experiences infrequent gout attacks, which can occur in various locations such as his knees and ankles. He had an episode yesterday, which resolved with one dose of indomethacin and hydrocodone. He continues to take allopurinol daily (300-400 mg). He is unsure of his last uric acid level but recalls it being checked. He has noticed tophi buildup but reports no associated pain. He maintains a low-purine diet. He rolled his ankle two days ago, which he believes triggered the recent gout attack. He has not tried colchicine. He has been on FMLA for gout for the past 16 years but has never utilized it.    SOCIAL HISTORY  - Does not smoke    FAMILY HISTORY  - Father is currently in hospice care  - Mother is hypoglycemic but has not complained about it in years    MEDICATIONS  - Atorvastatin  - Metoprolol  - Losartan  - Spironolactone  - Ozempic  - Synjardy  - Allopurinol  - Indomethacin  - Hydrocodone    Results  Laboratory Studies  A1c was 8.8 in December 2024 and February 2025. B12 was normal. Vitamin D was low. Thyroid was normal. Cholesterol was normal.    Imaging  Echocardiogram showed global hypokinesia and severe hypokinesis at the apex of the heart and septal area.     Health Maintenance: Completed    ROS:  ROS    Review of systems unremarkable except for concerns noted by patient or items listed.    Please see HPI for additional ROS.      Objective:     Exam:  BP 96/64   Pulse 94   Temp 36.5 °C (97.7 °F) (Temporal)   Resp 16   Ht 1.88 m (6' 2\")   Wt 98.4 kg (217 lb)   SpO2 96%   BMI 27.86 kg/m²  Body mass index is 27.86 kg/m².    Physical Exam  Constitutional:       General: He is not in acute distress.     Appearance: Normal appearance.   HENT:      Head: Normocephalic.   Cardiovascular:      Rate and Rhythm: Normal rate " and regular rhythm.      Pulses: Normal pulses.      Heart sounds: Normal heart sounds.   Pulmonary:      Effort: Pulmonary effort is normal.      Breath sounds: Normal breath sounds.   Skin:     General: Skin is warm.   Neurological:      Mental Status: He is alert and oriented to person, place, and time.   Psychiatric:         Mood and Affect: Mood normal.         Behavior: Behavior normal.             Labs: Reviewed    Assessment & Plan:     44 y.o. male with the following -     1. Type 2 diabetes mellitus without complication, without long-term current use of insulin ([facility]) (Primary)  Chronic, stable   Latest A1C: 8.8  Meds: synjardy 12.5 -1000 mg BID , ozempic 0.5 mg every 7 days   Following up with endocrinology - [facility]   Plan:   Continue Current medications   Recommended :  - Annual eye exam  - Regular foot exam  - dietary modification - low carbohydrate diet   - regular exercise, weight loss  - Educated patient on meal planning, ideal carb controlled diet, exercise and weight loss  - Discussed prevention and management of hypoglycemia  - Side effects of all medications discussed with patient  - Follow up in 6 months   - Advised to continue these medications and monitor blood sugar levels closely  - Emphasized importance of maintaining A1c level below 7  - Advised to continue taking multivitamins and vitamin D supplements      2. Cardiomyopathy, unspecified type ([facility])  Chronic, stable  Plan  Recommended to take half the dose of spironolactone 12.5 mg instead of 25 mg daily due to his soft blood pressure readings if symptomatic.  Continue metoprolol 25 mg daily, losartan 25 mg daily, spironolactone, Jardiance  Education provided to patient that Synjardy has Jardiance which can also cause low blood pressures due to dehydration.  Recommended good hydration to avoid low blood pressures  - Blood pressure currently low, no dizziness or fluid retention  - Discussed potential side effects of Synjardy, including  hypotension due to increased urination  - Advised to increase water intake to manage hypotension  - Discussed possibility of reducing spironolactone dosage if dizziness or loss of consciousness occurs due to low blood pressure  - Instructed to contact Dr. Matias via [employer] if dizziness or loss of consciousness occurs    3. Idiopathic chronic gout of multiple sites with tophus  Chronic, stable  Patient was previously taking indomethacin for acute gout episodes but given his cardiac history and current use of aspirin 81 mg I have recommended him to consider trying colchicine for acute gout episodes which patient verbalized understanding and agrees with the plan  - Experiences rare gout attacks  - Currently on allopurinol 400 mg daily  - Advised to continue this regimen  - Provided prescription for colchicine 0.6 mg with instructions:    - Take two pills at onset of redness or inflammation    - Follow with one pill after two hours    - Then one pill daily for the next two days  - If colchicine proves ineffective, may revert to using indomethacin  - colchicine (COLCRYS) 0.6 MG Tab; Take 1.2 mg by mouth once and then repeat 0.6 mg after 2 hours not to exceed 1.8 mg a day and then 0.6 mg twice daily until resolved up to 7 days  Dispense: 60 Tablet; Refill: 1            Follow-up in 3-6 months for annual        Please note that this dictation was created using voice recognition software. I have made every reasonable attempt to correct obvious errors, but I expect that there are errors of grammar and possibly content that I did not discover before finalizing the note.

## 2025-04-17 ENCOUNTER — PATIENT MESSAGE (OUTPATIENT)
Dept: MEDICAL GROUP | Facility: MEDICAL CENTER | Age: 45
End: 2025-04-17
Payer: COMMERCIAL

## 2025-05-16 DIAGNOSIS — I42.9 CARDIOMYOPATHY, UNSPECIFIED TYPE (HCC): Primary | ICD-10-CM

## 2025-07-29 ENCOUNTER — TELEPHONE (OUTPATIENT)
Dept: CARDIOLOGY | Facility: MEDICAL CENTER | Age: 45
End: 2025-07-29
Payer: COMMERCIAL

## 2025-08-07 ENCOUNTER — APPOINTMENT (OUTPATIENT)
Dept: CARDIOLOGY | Facility: MEDICAL CENTER | Age: 45
End: 2025-08-07
Attending: INTERNAL MEDICINE
Payer: COMMERCIAL